# Patient Record
(demographics unavailable — no encounter records)

---

## 2024-11-10 NOTE — PHYSICAL EXAM
[General Appearance - Alert] : alert [General Appearance - In No Acute Distress] : in no acute distress [Sclera] : the sclera and conjunctiva were normal [Extraocular Movements] : extraocular movements were intact [PERRL With Normal Accommodation] : pupils were equal in size, round, and reactive to light [Outer Ear] : the ears and nose were normal in appearance [Oropharynx] : the oropharynx was normal [Neck Appearance] : the appearance of the neck was normal [Jugular Venous Distention Increased] : there was no jugular-venous distention [Neck Cervical Mass (___cm)] : no neck mass was observed [Thyroid Diffuse Enlargement] : the thyroid was not enlarged [Thyroid Nodule] : there were no palpable thyroid nodules [Auscultation Breath Sounds / Voice Sounds] : lungs were clear to auscultation bilaterally [Heart Rate And Rhythm] : heart rate was normal and rhythm regular [Heart Sounds] : normal S1 and S2 [Murmurs] : no murmurs [Heart Sounds Gallop] : no gallops [Heart Sounds Pericardial Friction Rub] : no pericardial rub [Arterial Pulses Carotid] : carotid pulses were normal with no bruits [Abdominal Aorta] : the abdominal aorta was normal [Arterial Pulses Femoral] : femoral pulses were normal without bruits [Full Pulse] : the pedal pulses are present [Edema] : there was no peripheral edema [Veins - Varicosity Changes] : there were no varicosital changes [Breast Appearance] : normal in appearance [Breast Abnormal Lactation (Galactorrhea)] : no nipple discharge [Breast Palpation Mass] : no palpable masses [Bowel Sounds] : normal bowel sounds [Abdomen Soft] : soft [Abdomen Tenderness] : non-tender [Abdomen Mass (___ Cm)] : no abdominal mass palpated [Cervical Lymph Nodes Enlarged Posterior Bilaterally] : posterior cervical [Cervical Lymph Nodes Enlarged Anterior Bilaterally] : anterior cervical [Supraclavicular Lymph Nodes Enlarged Bilaterally] : supraclavicular [Axillary Lymph Nodes Enlarged Bilaterally] : axillary [Femoral Lymph Nodes Enlarged Bilaterally] : femoral [Inguinal Lymph Nodes Enlarged Bilaterally] : inguinal [No Spinal Tenderness] : no spinal tenderness [Nail Clubbing] : no clubbing  or cyanosis of the fingernails [Abnormal Walk] : normal gait [Musculoskeletal - Swelling] : no joint swelling seen [Motor Tone] : muscle strength and tone were normal [Skin Color & Pigmentation] : normal skin color and pigmentation [Skin Turgor] : normal skin turgor [] : no rash [Cranial Nerves] : cranial nerves 2-12 were intact [No Focal Deficits] : no focal deficits [Oriented To Time, Place, And Person] : oriented to person, place, and time [Impaired Insight] : insight and judgment were intact [Affect] : the affect was normal

## 2024-11-10 NOTE — PHYSICAL EXAM
[General Appearance - Alert] : alert [General Appearance - In No Acute Distress] : in no acute distress [Sclera] : the sclera and conjunctiva were normal [PERRL With Normal Accommodation] : pupils were equal in size, round, and reactive to light [Extraocular Movements] : extraocular movements were intact [Outer Ear] : the ears and nose were normal in appearance [Oropharynx] : the oropharynx was normal [Neck Appearance] : the appearance of the neck was normal [Neck Cervical Mass (___cm)] : no neck mass was observed [Jugular Venous Distention Increased] : there was no jugular-venous distention [Thyroid Diffuse Enlargement] : the thyroid was not enlarged [Thyroid Nodule] : there were no palpable thyroid nodules [Auscultation Breath Sounds / Voice Sounds] : lungs were clear to auscultation bilaterally [Heart Rate And Rhythm] : heart rate was normal and rhythm regular [Heart Sounds] : normal S1 and S2 [Heart Sounds Gallop] : no gallops [Murmurs] : no murmurs [Heart Sounds Pericardial Friction Rub] : no pericardial rub [Arterial Pulses Carotid] : carotid pulses were normal with no bruits [Abdominal Aorta] : the abdominal aorta was normal [Full Pulse] : the pedal pulses are present [Arterial Pulses Femoral] : femoral pulses were normal without bruits [Edema] : there was no peripheral edema [Veins - Varicosity Changes] : there were no varicosital changes [Breast Appearance] : normal in appearance [Breast Palpation Mass] : no palpable masses [Breast Abnormal Lactation (Galactorrhea)] : no nipple discharge [Bowel Sounds] : normal bowel sounds [Abdomen Soft] : soft [Abdomen Tenderness] : non-tender [Abdomen Mass (___ Cm)] : no abdominal mass palpated [Cervical Lymph Nodes Enlarged Posterior Bilaterally] : posterior cervical [Cervical Lymph Nodes Enlarged Anterior Bilaterally] : anterior cervical [Supraclavicular Lymph Nodes Enlarged Bilaterally] : supraclavicular [Axillary Lymph Nodes Enlarged Bilaterally] : axillary [Femoral Lymph Nodes Enlarged Bilaterally] : femoral [Inguinal Lymph Nodes Enlarged Bilaterally] : inguinal [No Spinal Tenderness] : no spinal tenderness [Nail Clubbing] : no clubbing  or cyanosis of the fingernails [Abnormal Walk] : normal gait [Musculoskeletal - Swelling] : no joint swelling seen [Motor Tone] : muscle strength and tone were normal [Skin Color & Pigmentation] : normal skin color and pigmentation [Skin Turgor] : normal skin turgor [] : no rash [Cranial Nerves] : cranial nerves 2-12 were intact [No Focal Deficits] : no focal deficits [Oriented To Time, Place, And Person] : oriented to person, place, and time [Affect] : the affect was normal [Impaired Insight] : insight and judgment were intact

## 2024-11-11 NOTE — HISTORY OF PRESENT ILLNESS
[FreeTextEntry1] : 79-year-old female who with history of chronic asthma/allergies for which she takes Advair, along with hypertension on valsartan, prediabetes,hypothyroidism on levothyroxine and GERD presents for her yearly physical.  She saw cardiology in 2023 with echocardiogram March 2023 showing normal LVEF with mild concentric LVH with no valvular issues.  Also monitor showed essentially normal sinus rhythm with no atrial fibrillation with PVC burden at 1.37% and occasional SVT.  Patient has not followed up with cardiology since.  She was diagnosed with uterine cancer and is status post ASIA/BSO February 2023 which went well.  She continues to follow-up with oncological gynecology with everything being stable and no further treatment needed at this point.  Her asthma has been under fairly not daily use of Advair but uses it as needed if she is having an issue.  Infrequent use.  Her GERD is generally under control without any medication.  Patient also with elevated sugar/prediabetes and family history of diabetes and discussed the need for lifestyle changes to lessen risks of diabetes.The patient states that she occasionally walks on the treadmill but has made no dietary changes.  Otherwise no complaints of chest pain, palpitations, shortness of breath or edema. She does complain about decreased hearing.  Recent issue with her right knee secondary to osteoarthritis with injection with benefit.  The patient is  and has 3 children and is a retired  for the [employer]  Sadly, her 46-year-old son with diabetes and obese passed away August 28, 2021 secondary to complications of COVID infection. He was living in [place]. The patient is coping with this as best as possible.  She does have some feelings of depression but nothing that significantly affects her life or her day.

## 2024-11-11 NOTE — HEALTH RISK ASSESSMENT
[Good] : ~his/her~  mood as  good [Yes] : Yes [1 or 2 (0 pts)] : 1 or 2 (0 points) [Monthly or less (1 pt)] : Monthly or less (1 point) [Never (0 pts)] : Never (0 points) [No] : In the past 12 months have you used drugs other than those required for medical reasons? No [No falls in past year] : Patient reported no falls in the past year [3] : 2) Feeling down, depressed, or hopeless for nearly every day (3) [1/2 of Days or More (2)] : 3.) Trouble falling asleep, or sleeping too much? Half the days or more [Nearly Every Day (3)] : 4.) Feeling tired or having little energy? Nearly every day [Not at All (0)] : 8.) Moving or speaking so slowly that other people could have noticed, or the opposite, moving or speaking faster than usual? Not at all [Moderate] : Severity of Depression is Moderate [Somewhat Difficult] : How difficult have these problems made it for you to do your work, take care of things at home, or get along with people? Somewhat difficult [PHQ-9 Positive] : PHQ-9 Positive [I have developed a follow-up plan documented below in the note.] : I have developed a follow-up plan documented below in the note. [Never] : Never [Patient reported PAP Smear was normal] : Patient reported PAP Smear was normal [None] : None [With Family] : lives with family [# of Members in Household ___] :  household currently consist of [unfilled] member(s) [Retired] : retired [High School] : high school [] :  [# Of Children ___] : has [unfilled] children [Sexually Active] : sexually active [Feels Safe at Home] : Feels safe at home [Fully functional (bathing, dressing, toileting, transferring, walking, feeding)] : Fully functional (bathing, dressing, toileting, transferring, walking, feeding) [Fully functional (using the telephone, shopping, preparing meals, housekeeping, doing laundry, using] : Fully functional and needs no help or supervision to perform IADLs (using the telephone, shopping, preparing meals, housekeeping, doing laundry, using transportation, managing medications and managing finances) [Reports changes in hearing] : Reports changes in hearing [Smoke Detector] : smoke detector [Carbon Monoxide Detector] : carbon monoxide detector [Seat Belt] :  uses seat belt [Sunscreen] : uses sunscreen [Reviewed no changes] : Reviewed, no changes [Discussed at today's visit] : Advance Directives Discussed at today's visit [Designated Healthcare Proxy] : Designated healthcare proxy [Name: ___] : Health Care Proxy's Name: [unfilled]  [[id number]] : 11 [Very Good] : ~his/her~  mood as very good [0] : 2) Feeling down, depressed, or hopeless: Not at all (0) [PHQ-2 Negative - No further assessment needed] : PHQ-2 Negative - No further assessment needed [NO] : No [Audit-CScore] : 1 [de-identified] : walking occasionally [de-identified] : good [[id number]] : 0 [Change in mental status noted] : No change in mental status noted [Reports changes in vision] : Reports no changes in vision [Reports changes in dental health] : Reports no changes in dental health [FreeTextEntry2] : Romeo JOHN [de-identified] :  [AdvancecareDate] : 11/24

## 2024-11-11 NOTE — COUNSELING
[Potential consequences of obesity discussed] : Potential consequences of obesity discussed [Benefits of weight loss discussed] : Benefits of weight loss discussed [Encouraged to increase physical activity] : Encouraged to increase physical activity [Healthy eating counseling provided] : healthy eating [Decrease Portions] : Decrease food portions [Walking] : Walking [None] : None [Needs reinforcement, provided] : Patient needs reinforcement on understanding lifestyle changes and  the steps needed to achieve self management goals and reinforcement was provided [[id number]] : \par  Diet exercise lose weight and improve sugar control

## 2024-11-11 NOTE — DATA REVIEWED
[FreeTextEntry1] : EKG-NSR, WNL   echocardiogram March 2023 with normal LVEF with mild LVH with no valvular issues Holter monitor with normal sinus rhythm with PACs and PVCs without atrial fibrillation

## 2024-11-11 NOTE — HEALTH RISK ASSESSMENT
[Good] : ~his/her~  mood as  good [Yes] : Yes [1 or 2 (0 pts)] : 1 or 2 (0 points) [Monthly or less (1 pt)] : Monthly or less (1 point) [Never (0 pts)] : Never (0 points) [No] : In the past 12 months have you used drugs other than those required for medical reasons? No [No falls in past year] : Patient reported no falls in the past year [3] : 2) Feeling down, depressed, or hopeless for nearly every day (3) [1/2 of Days or More (2)] : 3.) Trouble falling asleep, or sleeping too much? Half the days or more [Nearly Every Day (3)] : 4.) Feeling tired or having little energy? Nearly every day [Not at All (0)] : 8.) Moving or speaking so slowly that other people could have noticed, or the opposite, moving or speaking faster than usual? Not at all [Moderate] : Severity of Depression is Moderate [Somewhat Difficult] : How difficult have these problems made it for you to do your work, take care of things at home, or get along with people? Somewhat difficult [PHQ-9 Positive] : PHQ-9 Positive [I have developed a follow-up plan documented below in the note.] : I have developed a follow-up plan documented below in the note. [Never] : Never [Patient reported PAP Smear was normal] : Patient reported PAP Smear was normal [None] : None [With Family] : lives with family [# of Members in Household ___] :  household currently consist of [unfilled] member(s) [Retired] : retired [High School] : high school [] :  [# Of Children ___] : has [unfilled] children [Sexually Active] : sexually active [Fully functional (bathing, dressing, toileting, transferring, walking, feeding)] : Fully functional (bathing, dressing, toileting, transferring, walking, feeding) [Feels Safe at Home] : Feels safe at home [Fully functional (using the telephone, shopping, preparing meals, housekeeping, doing laundry, using] : Fully functional and needs no help or supervision to perform IADLs (using the telephone, shopping, preparing meals, housekeeping, doing laundry, using transportation, managing medications and managing finances) [Reports changes in hearing] : Reports changes in hearing [Smoke Detector] : smoke detector [Carbon Monoxide Detector] : carbon monoxide detector [Seat Belt] :  uses seat belt [Sunscreen] : uses sunscreen [Reviewed no changes] : Reviewed, no changes [Discussed at today's visit] : Advance Directives Discussed at today's visit [Designated Healthcare Proxy] : Designated healthcare proxy [Name: ___] : Health Care Proxy's Name: [unfilled]  [[id number]] : 11 [Very Good] : ~his/her~  mood as very good [0] : 2) Feeling down, depressed, or hopeless: Not at all (0) [PHQ-2 Negative - No further assessment needed] : PHQ-2 Negative - No further assessment needed [NO] : No [Audit-CScore] : 1 [de-identified] : walking occasionally [de-identified] : good [ZIP6Beose] : 0 [Change in mental status noted] : No change in mental status noted [Reports changes in vision] : Reports no changes in vision [Reports changes in dental health] : Reports no changes in dental health [FreeTextEntry2] : Romeo JOHN [de-identified] :  [AdvancecareDate] : 11/24

## 2024-11-11 NOTE — COUNSELING
[Potential consequences of obesity discussed] : Potential consequences of obesity discussed [Benefits of weight loss discussed] : Benefits of weight loss discussed [Encouraged to increase physical activity] : Encouraged to increase physical activity [Healthy eating counseling provided] : healthy eating [Decrease Portions] : Decrease food portions [Walking] : Walking [Needs reinforcement, provided] : Patient needs reinforcement on understanding lifestyle changes and  the steps needed to achieve self management goals and reinforcement was provided [None] : None [[id number]] : \par  Diet exercise lose weight and improve sugar control

## 2024-11-11 NOTE — ASSESSMENT
[FreeTextEntry1] : 79-year-old female currently relatively medically stable with stable medical issues.  Diagnosis of uterine cancer status post ASIA/BSO February 2023 with patient following with gynecological oncology stating that no further treatment needed at the present time.  We will call to get records.  Follow-up as scheduled.  Follow-up with cardiology  Asthma currently controlled without daily Advair but states will use if she is having symptoms.  Also patient needs dietary changes with low-carb and decrease portions along with regular exercise and weight loss to prevent diabetes.  Patient is to continue present medications  Mammography May 2024 Bone density May 2024 with osteopenia Colonoscopy September 2022  Shingles vaccine discussed High-dose influenza vaccine already received Recieved the COVID vaccine Tdap July 2017 Pneumococcal vaccines received  Venipuncture done today in the office  Followup in 4 months.
[FreeTextEntry1] : 79-year-old female currently relatively medically stable with stable medical issues.  Diagnosis of uterine cancer status post ASIA/BSO February 2023 with patient following with gynecological oncology stating that no further treatment needed at the present time.  We will call to get records.  Follow-up as scheduled.  Follow-up with cardiology  Asthma currently controlled without daily Advair but states will use if she is having symptoms.  Also patient needs dietary changes with low-carb and decrease portions along with regular exercise and weight loss to prevent diabetes.  Patient is to continue present medications  Mammography May 2024 Bone density May 2024 with osteopenia Colonoscopy September 2022  Shingles vaccine discussed High-dose influenza vaccine already received Recieved the COVID vaccine Tdap July 2017 Pneumococcal vaccines received  Venipuncture done today in the office  Followup in 4 months.
Normal vision: sees adequately in most situations; can see medication labels, newsprint

## 2024-12-23 NOTE — ASSESSMENT
[FreeTextEntry1] : Patient signs and symptoms most compatible with bronchitis/tracheitis with underlying history of asthma. Probably viral but recommended complete 7-day course of doxycycline. Continue use of nebulizers every 4-6 hours. Start prednisone 40 mg for 3 days and decrease 10 mg every 3 days. Also benzonatate cough suppressant, plenty of rest and fluids Call if symptoms persist or worsen

## 2024-12-23 NOTE — HISTORY OF PRESENT ILLNESS
[FreeTextEntry8] : The patient presents for an acute visit secondary to continuing not to feel well which started about 10 days ago. She went to urgent care on 12/18 with symptoms mainly being cough which is dry and hacking with no fever or chills.  Initially she also had sore throat with laryngitis which improved over 2 to 3 days. At urgent care she thought likely to have a viral infection but given doxycycline 100 mg twice a day for 7 days which she has taken for 5 days.  Also told her to use her nebulizers which the patient has been doing with benefit but using frequently. She feels better and that no longer does she have laryngitis or sore throat but continues to occasionally wheeze and have dry hacking cough.  No shortness of breath.

## 2024-12-23 NOTE — PHYSICAL EXAM
[No Acute Distress] : no acute distress [Normal Outer Ear/Nose] : the outer ears and nose were normal in appearance [Normal Oropharynx] : the oropharynx was normal [Normal TMs] : both tympanic membranes were normal [No Lymphadenopathy] : no lymphadenopathy [No Respiratory Distress] : no respiratory distress  [No Accessory Muscle Use] : no accessory muscle use [Clear to Auscultation] : lungs were clear to auscultation bilaterally [Normal Rate] : normal rate  [Regular Rhythm] : with a regular rhythm [No Focal Deficits] : no focal deficits [Normal Affect] : the affect was normal [de-identified] : Not ill-appearing [de-identified] : Cough with deep breath

## 2025-01-03 NOTE — PHYSICAL EXAM
[No Acute Distress] : no acute distress [No Respiratory Distress] : no respiratory distress  [Normal Rate] : normal rate  [Regular Rhythm] : with a regular rhythm [Normal Affect] : the affect was normal [Normal Mood] : the mood was normal [Normal Outer Ear/Nose] : the outer ears and nose were normal in appearance [Normal TMs] : both tympanic membranes were normal [Normal Nasal Mucosa] : the nasal mucosa was normal [Supple] : supple [de-identified] : +PND [de-identified] : + Scattered wheezing/rhonchi with increased cough

## 2025-01-03 NOTE — ASSESSMENT
[FreeTextEntry1] : Chest x-ray ordered.  Viral swab sent.  Will retreat with Z-Reynold No. 1 as directed/Medrol Dosepak and Astelin nasal spray, continue Tessalon and nebulizers as needed.  Consider pulmonary evaluation.Patient advised to rest/increase fluids and use supportive therapy. Patient will call if symptoms persist or worsen and return to the office as scheduled for regular followup.    Attending MD available by phone if needed

## 2025-01-03 NOTE — HISTORY OF PRESENT ILLNESS
[FreeTextEntry8] : See prior note as patient was seen on 12/23/2024 in the setting of being unwell.  Patient had previously gone to urgent care on 12/18/24( COVID testing as negative), was given doxycycline x 7 days.  Patient was then seen here by attending MD and given prednisone 10 mg taper and told to use nebulizers as needed.  Patient states she continues to cough, has postnasal drip.  Patient denies fever/GERD or overt dyspnea but states she is getting into terrible coughing fits, Tessalon does help a little bit.  Patient continues on Advair

## 2025-03-14 NOTE — ASSESSMENT
[FreeTextEntry1] : Venipuncture done in our office, Labs sent out.Urine sent out.Patient advised to continue present medications with diet/exercise and specialist followup. RTO in 3 to 4 months  vaccines are UTD/ +got covid vaccines  mammogram =5/2024=rx given Last bone density was 5/2024 colonoscopy was 9/2022 specialists include 1. GYN=Dr. Roberts 2.[facility] 4.Surgeon for hysterectomy=Dr. Gaspar 5.Cardio-Dr. Rabago 6.Ortho-Dr. Duarte 7.GYN/[facility]-Dr. Gaspar 8.Therapist + Hepatitis C screening in the past was negative.

## 2025-03-14 NOTE — HISTORY OF PRESENT ILLNESS
[FreeTextEntry1] : Patient presents for followup on hypertension/hypothyroid/asthma. Patient is currently fasting for today's lab. Patient is currently on Norvasc/Diovan for hypertension, on levothyroxine for hypothyroidism and is on Advair for her asthma. -see last labs showed iron deficiency and told to see GI, "has apt sched for april"

## 2025-04-01 NOTE — PHYSICAL EXAM
[Alert] : alert [Normal Voice/Communication] : normal voice/communication [Healthy Appearing] : healthy appearing [No Acute Distress] : no acute distress [Sclera] : the sclera and conjunctiva were normal [Hearing Threshold Finger Rub Not [place]] : hearing was normal [Normal Appearance] : the appearance of the neck was normal [No Respiratory Distress] : no respiratory distress [No Acc Muscle Use] : no accessory muscle use [Respiration, Rhythm And Depth] : normal respiratory rhythm and effort [Heart Rate And Rhythm] : heart rate was normal and rhythm regular [Abdomen Tenderness] : non-tender [No Masses] : no abdominal mass palpated [Abdomen Soft] : soft [Abnormal Walk] : normal gait [Normal Color / Pigmentation] : normal skin color and pigmentation [No Focal Deficits] : no focal deficits [Oriented To Time, Place, And Person] : oriented to person, place, and time

## 2025-04-01 NOTE — PHYSICAL EXAM
[Alert] : alert [Normal Voice/Communication] : normal voice/communication [Healthy Appearing] : healthy appearing [No Acute Distress] : no acute distress [Sclera] : the sclera and conjunctiva were normal [Hearing Threshold Finger Rub Not [place]] : hearing was normal [Normal Appearance] : the appearance of the neck was normal [No Respiratory Distress] : no respiratory distress [No Acc Muscle Use] : no accessory muscle use [Respiration, Rhythm And Depth] : normal respiratory rhythm and effort [Heart Rate And Rhythm] : heart rate was normal and rhythm regular [Abdomen Tenderness] : non-tender [No Masses] : no abdominal mass palpated [Abdomen Soft] : soft [Abnormal Walk] : normal gait [Normal Color / Pigmentation] : normal skin color and pigmentation [No Focal Deficits] : no focal deficits [Oriented To Time, Place, And Person] : oriented to person, place, and time

## 2025-04-05 NOTE — HISTORY OF PRESENT ILLNESS
[de-identified] : Many years ago- normal results per patient- doesn't remember where or why she had the test done.  [FreeTextEntry1] : 9/7/2022 Dr Sharma: Findings: Mucosa Normal mucosa was noted in the cecum, ascending colon, transverse colon, descending colon and sigmoid colon. There were no AVMs, diverticula, polyps, masses, evidence of colitis or other abnormalities seen. Retroflexion of the scope in the rectum revealed no abnormalities. Protruding lesions Grade/Stage I internal hemorrhoids were noted.  Impressions: Grade/Stage I internal hemorrhoids. Normal mucosa in the cecum, ascending colon, transverse colon, descending colon and sigmoid colon.  Plan: Follow-up as needed  Reported history of colon polyps on previous colonoscopy (~2012) --

## 2025-04-05 NOTE — ASSESSMENT
[FreeTextEntry1] : - EGD and colonoscopy - CT AP due to new abdominal bloating and h/o endometrial cancer

## 2025-04-05 NOTE — HISTORY OF PRESENT ILLNESS
[de-identified] : Many years ago- normal results per patient- doesn't remember where or why she had the test done.  [FreeTextEntry1] : 9/7/2022 Dr Sharma: Findings: Mucosa Normal mucosa was noted in the cecum, ascending colon, transverse colon, descending colon and sigmoid colon. There were no AVMs, diverticula, polyps, masses, evidence of colitis or other abnormalities seen. Retroflexion of the scope in the rectum revealed no abnormalities. Protruding lesions Grade/Stage I internal hemorrhoids were noted.  Impressions: Grade/Stage I internal hemorrhoids. Normal mucosa in the cecum, ascending colon, transverse colon, descending colon and sigmoid colon.  Plan: Follow-up as needed  Reported history of colon polyps on previous colonoscopy (~2012) --

## 2025-06-17 NOTE — RESULTS/DATA
[] : results reviewed [de-identified] : WNL [de-identified] : WNL [de-identified] : WNL [de-identified] : NACPD [de-identified] : NSR, TRESL

## 2025-06-17 NOTE — RESULTS/DATA
[] : results reviewed [de-identified] : WNL [de-identified] : WNL [de-identified] : NACPD [de-identified] : WNL [de-identified] : NSR, TRESL

## 2025-06-18 NOTE — HISTORY OF PRESENT ILLNESS
[No Pertinent Cardiac History] : no history of aortic stenosis, atrial fibrillation, coronary artery disease, recent myocardial infarction, or implantable device/pacemaker [Asthma] : asthma [No Adverse Anesthesia Reaction] : no adverse anesthesia reaction in self or family member [(Patient denies any chest pain, claudication, dyspnea on exertion, orthopnea, palpitations or syncope)] : Patient denies any chest pain, claudication, dyspnea on exertion, orthopnea, palpitations or syncope [Good (7-10 METs)] : Good (7-10 METs) [COPD] : no COPD [Sleep Apnea] : no sleep apnea [Smoker] : not a smoker [Chronic Anticoagulation] : no chronic anticoagulation [Chronic Kidney Disease] : no chronic kidney disease [Diabetes] : no diabetes [FreeTextEntry1] : Colonoscopy [FreeTextEntry2] : June 25, 2025 [FreeTextEntry3] : Dr Jiang [FreeTextEntry4] : 79-year-old female presents for medical evaluation prior to colonoscopy.  Medical history includes asthma with daily Advair inhaler with good control of her asthma with no recent attacks and infrequent use of her rescue inhaler. Also hypertension, hypothyroidism and prediabetes.  Cardiology evaluation in 2023 showing echo with normal LVEF with mild LVH and no valvular issues.  Holter monitor with sinus rhythm with occasional PVCs and occasional SVT.  No cardiac issues.  Uterine CA status post ASIA/BSO February 2023 with no evidence of disease.  No cardiac, hepatorenal, hematological or diabetes.  Generally feeling well without any complaints including no chest pain, palpitations, shortness of breath or edema.

## 2025-06-18 NOTE — ASSESSMENT
[Patient Optimized for Surgery] : Patient optimized for surgery [No Further Testing Recommended] : no further testing recommended [Continue medications as is] : Continue current medications [As per surgery] : as per surgery [FreeTextEntry4] : 79-year-old female currently medically stable with no cardiac history and chronic mild asthma currently controlled with infrequent use of rescue inhaler.  Therefore cleared with no contraindication to planned low risk procedure.  Patient instructed to take her maintenance inhaler the morning of the procedure as well as a rescue inhaler as prophylaxis.

## 2025-06-25 NOTE — PHYSICAL EXAM
[Alert] : alert [Normal Voice/Communication] : normal voice/communication [Healthy Appearing] : healthy appearing [No Acute Distress] : no acute distress [Well Developed] : well developed [Well Nourished] : well nourished [Sclera] : the sclera and conjunctiva were normal [Normal Lips/Gums] : the lips and gums were normal [Hearing Threshold Finger Rub Not Will] : hearing was normal [Oropharynx] : the oropharynx was normal [Normal Appearance] : the appearance of the neck was normal [No Neck Mass] : no neck mass was observed [No Respiratory Distress] : no respiratory distress [No Acc Muscle Use] : no accessory muscle use [Respiration, Rhythm And Depth] : normal respiratory rhythm and effort [Auscultation Breath Sounds / Voice Sounds] : lungs were clear to auscultation bilaterally [Heart Rate And Rhythm] : heart rate was normal and rhythm regular [Normal S1, S2] : normal S1 and S2 [Murmurs] : no murmurs [None] : no edema [Bowel Sounds] : normal bowel sounds [Abdomen Tenderness] : non-tender [No Masses] : no abdominal mass palpated [Abdomen Soft] : soft [] : no hepatosplenomegaly [Normal Color / Pigmentation] : normal skin color and pigmentation [Oriented To Time, Place, And Person] : oriented to person, place, and time